# Patient Record
Sex: FEMALE | Race: WHITE | NOT HISPANIC OR LATINO | URBAN - METROPOLITAN AREA
[De-identification: names, ages, dates, MRNs, and addresses within clinical notes are randomized per-mention and may not be internally consistent; named-entity substitution may affect disease eponyms.]

---

## 2023-06-09 NOTE — ASU PATIENT PROFILE, ADULT - NS PREOP UNDERSTANDS INFO
No solid food/dairy/candy/gum after 21:30pm Sunday, water allowed before 04:30am Monday. Patient reminded to come with photo ID/insurance card, dress comfortable/no jewelries/contact lens/valuables, no smoking/alcohol/recreational drug use Sunday; escort to come with photo ID, address and callback number was given./yes

## 2023-06-09 NOTE — ASU PATIENT PROFILE, ADULT - NSICDXPASTMEDICALHX_GEN_ALL_CORE_FT
PAST MEDICAL HISTORY:  Anxiety with depression     Herpes     HTN (hypertension)     Hyperlipidemia

## 2023-06-09 NOTE — ASU PATIENT PROFILE, ADULT - NSICDXPASTSURGICALHX_GEN_ALL_CORE_FT
PAST SURGICAL HISTORY:  H/O spinal fusion back    S/P arthroscopy of left knee     S/P arthroscopy of left shoulder     S/P epidural steroid injection Multiple

## 2023-06-12 ENCOUNTER — OUTPATIENT (OUTPATIENT)
Dept: OUTPATIENT SERVICES | Facility: HOSPITAL | Age: 53
LOS: 1 days | Discharge: HOME | End: 2023-06-12
Payer: MEDICARE

## 2023-06-12 VITALS
HEART RATE: 58 BPM | OXYGEN SATURATION: 99 % | SYSTOLIC BLOOD PRESSURE: 116 MMHG | DIASTOLIC BLOOD PRESSURE: 77 MMHG | RESPIRATION RATE: 16 BRPM

## 2023-06-12 VITALS
WEIGHT: 154.54 LBS | HEART RATE: 62 BPM | DIASTOLIC BLOOD PRESSURE: 70 MMHG | OXYGEN SATURATION: 99 % | SYSTOLIC BLOOD PRESSURE: 122 MMHG | HEIGHT: 63 IN | RESPIRATION RATE: 15 BRPM | TEMPERATURE: 97 F

## 2023-06-12 DIAGNOSIS — Z98.1 ARTHRODESIS STATUS: Chronic | ICD-10-CM

## 2023-06-12 DIAGNOSIS — Z98.890 OTHER SPECIFIED POSTPROCEDURAL STATES: Chronic | ICD-10-CM

## 2023-06-12 DIAGNOSIS — Z92.241 PERSONAL HISTORY OF SYSTEMIC STEROID THERAPY: Chronic | ICD-10-CM

## 2023-06-12 PROCEDURE — 88305 TISSUE EXAM BY PATHOLOGIST: CPT | Mod: 26

## 2023-06-12 PROCEDURE — 88307 TISSUE EXAM BY PATHOLOGIST: CPT | Mod: 26

## 2023-06-12 PROCEDURE — 88112 CYTOPATH CELL ENHANCE TECH: CPT | Mod: 26

## 2023-06-12 RX ORDER — ACETAMINOPHEN 500 MG
1000 TABLET ORAL ONCE
Refills: 0 | Status: COMPLETED | OUTPATIENT
Start: 2023-06-12 | End: 2023-06-12

## 2023-06-12 RX ORDER — NALOXEGOL OXALATE 12.5 MG/1
1 TABLET, FILM COATED ORAL
Refills: 0 | DISCHARGE

## 2023-06-12 RX ORDER — SIMVASTATIN 20 MG/1
1 TABLET, FILM COATED ORAL
Refills: 0 | DISCHARGE

## 2023-06-12 RX ORDER — CHLORHEXIDINE GLUCONATE 213 G/1000ML
1 SOLUTION TOPICAL ONCE
Refills: 0 | Status: COMPLETED | OUTPATIENT
Start: 2023-06-12 | End: 2023-06-12

## 2023-06-12 RX ORDER — SODIUM CHLORIDE 9 MG/ML
1000 INJECTION, SOLUTION INTRAVENOUS
Refills: 0 | Status: DISCONTINUED | OUTPATIENT
Start: 2023-06-12 | End: 2023-06-12

## 2023-06-12 RX ORDER — LOSARTAN/HYDROCHLOROTHIAZIDE 100MG-25MG
1 TABLET ORAL
Refills: 0 | DISCHARGE

## 2023-06-12 RX ORDER — SERTRALINE 25 MG/1
1 TABLET, FILM COATED ORAL
Refills: 0 | DISCHARGE

## 2023-06-12 RX ORDER — APREPITANT 80 MG/1
40 CAPSULE ORAL ONCE
Refills: 0 | Status: COMPLETED | OUTPATIENT
Start: 2023-06-12 | End: 2023-06-12

## 2023-06-12 RX ORDER — OXYCODONE AND ACETAMINOPHEN 5; 325 MG/1; MG/1
1 TABLET ORAL
Refills: 0 | DISCHARGE

## 2023-06-12 RX ORDER — HYDROMORPHONE HYDROCHLORIDE 2 MG/ML
0.5 INJECTION INTRAMUSCULAR; INTRAVENOUS; SUBCUTANEOUS
Refills: 0 | Status: DISCONTINUED | OUTPATIENT
Start: 2023-06-12 | End: 2023-06-12

## 2023-06-12 RX ORDER — VALACYCLOVIR 500 MG/1
1 TABLET, FILM COATED ORAL
Refills: 0 | DISCHARGE

## 2023-06-12 RX ADMIN — Medication 1000 MILLIGRAM(S): at 05:56

## 2023-06-12 RX ADMIN — APREPITANT 40 MILLIGRAM(S): 80 CAPSULE ORAL at 05:56

## 2023-06-12 RX ADMIN — CHLORHEXIDINE GLUCONATE 1 APPLICATION(S): 213 SOLUTION TOPICAL at 05:56

## 2023-06-12 NOTE — ASU DISCHARGE PLAN (ADULT/PEDIATRIC) - NS MD DC FALL RISK RISK
For information on Fall & Injury Prevention, visit: [email or web address] OR  [email or web address] OR  https://www.cdc.gov/steadi/patient.html

## 2023-06-12 NOTE — ASU DISCHARGE PLAN (ADULT/PEDIATRIC) - CARE PROVIDER_API CALL
Dayna Mensah  Obstetrics and Gynecology  [address], [address]  [place] [address]  Phone: [phone]  Fax: [phone]  Follow Up Time:

## 2023-06-12 NOTE — PACU DISCHARGE NOTE - HYDRATION STATUS:
Satisfactory No respiratory distress. No stridor, Lungs sounds clear with good aeration bilaterally.

## 2023-06-12 NOTE — BRIEF OPERATIVE NOTE - OPERATION/FINDINGS
3 incisions with gel port in umbilicus. Solid R ovarian mass. Normal-appearing L ovary. Pelvic washings taken. R salpingoophorectomy with Ligasure. Excellent hemostasis. Mass morcellated by hand in endocatch bag through umbilical port. Umbilicus closed with Proline

## 2023-06-13 LAB — NON-GYNECOLOGICAL CYTOLOGY STUDY: SIGNIFICANT CHANGE UP

## 2023-06-19 LAB — SURGICAL PATHOLOGY STUDY: SIGNIFICANT CHANGE UP

## (undated) DEVICE — TUBING SUCTION NONCONDUCTIVE 6MM X 12FT

## (undated) DEVICE — TROCAR ETHICON ENDOPATH XCEL BLADELESS 5MM X 100MM STABILITY

## (undated) DEVICE — SOL IRR POUR H2O 500ML

## (undated) DEVICE — VENODYNE/SCD SLEEVE CALF MEDIUM

## (undated) DEVICE — DRAPE LAPAROTOMY T-DRAPE 102" X 121"

## (undated) DEVICE — SOL IRR BAG NS 0.9% 3000ML

## (undated) DEVICE — TROCAR GELPOINT MINI ADVANCED

## (undated) DEVICE — SYR LUER LOK 50CC

## (undated) DEVICE — UTERINE MANIPULATOR COOPER SURGICAL 5MM 33CM GREEN

## (undated) DEVICE — TUBING INSUFFLATION LAP FILTER 10FT

## (undated) DEVICE — WARMING BLANKET FULL ADULT

## (undated) DEVICE — TUBING OLYMPUS INSUFFLATION

## (undated) DEVICE — TRAP SPECIMEN SPUTUM 40CC

## (undated) DEVICE — LIGASURE BLUNT TIP 37CM

## (undated) DEVICE — BASIN SET SINGLE

## (undated) DEVICE — TIP METZENBAUM SCISSOR MONOPOLAR ENDOCUT (ORANGE)

## (undated) DEVICE — Device

## (undated) DEVICE — TROCAR COVIDIEN VERSAONE BLUNT TIP HASSAN 12MM

## (undated) DEVICE — FOLEY TRAY 16FR 5CC LF UMETER CLOSED

## (undated) DEVICE — TUBING HYDRO-SURG PLUS IRRIGATOR W SMOKEVAC & PROBE

## (undated) DEVICE — PACK GENERAL LAPAROSCOPY

## (undated) DEVICE — D HELP - CLEARVIEW CLEARIFY SYSTEM

## (undated) DEVICE — POSITIONER STRAP ARMBOARD VELCRO TS-30

## (undated) DEVICE — ENDOCATCH 10MM SPECIMEN POUCH

## (undated) DEVICE — SUT MONOCRYL 3-0 18" PS-2 UNDYED

## (undated) DEVICE — TROCAR COVIDIEN VERSAPORT BLADELESS OPTICAL 5MM STANDARD

## (undated) DEVICE — POSITIONER FOAM EGG CRATE ULNAR 2PCS (PINK)

## (undated) DEVICE — PREP BETADINE SPONGE STICKS

## (undated) DEVICE — SUT PROLENE 0 30" CT-2

## (undated) DEVICE — DRSG DERMABOND MINI